# Patient Record
Sex: MALE | Race: WHITE | URBAN - METROPOLITAN AREA
[De-identification: names, ages, dates, MRNs, and addresses within clinical notes are randomized per-mention and may not be internally consistent; named-entity substitution may affect disease eponyms.]

---

## 2017-01-19 ENCOUNTER — TELEPHONE (OUTPATIENT)
Dept: INTERNAL MEDICINE | Age: 32
End: 2017-01-19

## 2017-09-22 ENCOUNTER — TELEPHONE (OUTPATIENT)
Dept: INTERNAL MEDICINE | Age: 32
End: 2017-09-22

## 2017-09-22 RX ORDER — AZITHROMYCIN 250 MG/1
TABLET, FILM COATED ORAL
Qty: 1 PACKET | Refills: 0 | Status: SHIPPED | OUTPATIENT
Start: 2017-09-22 | End: 2017-10-02

## 2017-11-21 ENCOUNTER — OFFICE VISIT (OUTPATIENT)
Dept: INTERNAL MEDICINE | Age: 32
End: 2017-11-21

## 2017-11-21 VITALS
DIASTOLIC BLOOD PRESSURE: 70 MMHG | BODY MASS INDEX: 23.05 KG/M2 | SYSTOLIC BLOOD PRESSURE: 110 MMHG | WEIGHT: 161 LBS | HEIGHT: 70 IN

## 2017-11-21 DIAGNOSIS — L70.0 ACNE VULGARIS: ICD-10-CM

## 2017-11-21 DIAGNOSIS — Z00.00 PE (PHYSICAL EXAM), ANNUAL: Primary | ICD-10-CM

## 2017-11-21 PROCEDURE — 99395 PREV VISIT EST AGE 18-39: CPT | Performed by: INTERNAL MEDICINE

## 2017-11-21 RX ORDER — SULFAMETHOXAZOLE AND TRIMETHOPRIM 800; 160 MG/1; MG/1
1 TABLET ORAL ONCE
Qty: 30 TABLET | Refills: 3 | Status: SHIPPED | OUTPATIENT
Start: 2017-11-21 | End: 2017-11-21

## 2017-11-21 RX ORDER — CETIRIZINE HYDROCHLORIDE 10 MG/1
10 TABLET ORAL DAILY PRN
COMMUNITY

## 2017-11-21 ASSESSMENT — PATIENT HEALTH QUESTIONNAIRE - PHQ9
1. LITTLE INTEREST OR PLEASURE IN DOING THINGS: 0
SUM OF ALL RESPONSES TO PHQ QUESTIONS 1-9: 0
SUM OF ALL RESPONSES TO PHQ9 QUESTIONS 1 & 2: 0
2. FEELING DOWN, DEPRESSED OR HOPELESS: 0

## 2017-11-21 NOTE — PROGRESS NOTES
file.      Allergies:  Minocycline    Current Medications:    Prior to Admission medications    Medication Sig Start Date End Date Taking? Authorizing Provider   cetirizine (ZYRTEC) 10 MG tablet Take 10 mg by mouth daily as needed for Allergies   Yes Historical Provider, MD   sulfamethoxazole-trimethoprim (BACTRIM DS) 800-160 MG per tablet Take 1 tablet by mouth once for 1 dose 11/21/17 11/21/17 Yes Autumn Mars MD   Omega-3 Fatty Acids (FISH OIL) 1000 MG CAPS Take 3,000 mg by mouth 3 times daily   Yes Historical Provider, MD   Multiple Vitamins-Minerals (MULTIVITAMIN PO) Take  by mouth. Yes Historical Provider, MD           Physical Exam:      Constitutional:  Well developed, well nourished, no acute distress, non-toxic appearance   Eyes:  PERRL, conjunctiva normal   HENT:  Atraumatic, external ears normal, nose normal, oropharynx moist, no pharyngeal exudates. Neck- normal range of motion, no tenderness, supple   Respiratory:  No respiratory distress, normal breath sounds, no rales, no wheezing   Cardiovascular:  Normal rate, normal rhythm, no murmurs, no gallops, no rubs   GI:  Soft, nondistended, normal bowel sounds, nontender, no organomegaly, no mass, no rebound, no guarding   :  No costovertebral angle tenderness   Musculoskeletal:  No edema, no tenderness, no deformities. Back- no tenderness  Integument:  Well hydrated, active vulgaris with overlying crusting with excoriations over the upper back  Lymphatic:  No lymphadenopathy noted   Neurologic:  Alert & oriented x 3, CN 2-12 normal, normal motor function, normal sensory function, no focal deficits noted   Psychiatric:  Speech and behavior appropriate       Vitals: /70   Ht 5' 10\" (1.778 m)   Wt 161 lb (73 kg)   BMI 23.10 kg/m²     Body mass index is 23.1 kg/m².   Wt Readings from Last 3 Encounters:   11/21/17 161 lb (73 kg)   11/29/16 162 lb (73.5 kg)   11/19/15 155 lb (70.3 kg)         LABS:    CBC:   Lab Results   Component Value Date    WBC

## 2017-12-07 LAB
A/G RATIO: 1.7 (CALC) (ref 1–2.5)
ALBUMIN SERPL-MCNC: 4.6 G/DL (ref 3.6–5.1)
ALP BLD-CCNC: 83 U/L (ref 40–115)
ALT SERPL-CCNC: 18 U/L (ref 9–46)
AMORPHOUS: NORMAL /HPF
AST SERPL-CCNC: 16 U/L (ref 10–40)
ATYPICAL LYMPHOCYTE RELATIVE PERCENT: NORMAL % (ref 0–10)
BACTERIA: NORMAL /HPF
BAND NEUTROPHILS: NORMAL %
BANDED NEUTROPHILS ABSOLUTE COUNT: NORMAL CELLS/UL (ref 0–750)
BASOPHILS ABSOLUTE: 61 CELLS/UL (ref 0–200)
BASOPHILS RELATIVE PERCENT: 1.1 %
BILIRUB SERPL-MCNC: 0.7 MG/DL (ref 0.2–1.2)
BILIRUBIN URINE: NEGATIVE
BLASTS ABSOLUTE COUNT: NORMAL CELLS/UL
BLASTS RELATIVE PERCENT: NORMAL %
BLOOD, URINE: NEGATIVE
BUN / CREAT RATIO: NORMAL (CALC) (ref 6–22)
BUN BLDV-MCNC: 13 MG/DL (ref 7–25)
CALCIUM OXALATE CRYSTALS: NORMAL /HPF
CALCIUM SERPL-MCNC: 9.2 MG/DL (ref 8.6–10.3)
CASTS: NORMAL /LPF
CHLORIDE BLD-SCNC: 104 MMOL/L (ref 98–110)
CHOLESTEROL, TOTAL: 147 MG/DL
CHOLESTEROL/HDL RATIO: 3 (CALC)
CHOLESTEROL: 98 MG/DL (CALC)
CLARITY: CLEAR
CO2: 29 MMOL/L (ref 20–31)
COLOR: YELLOW
COMMENT: NORMAL
CREAT SERPL-MCNC: 0.91 MG/DL (ref 0.6–1.35)
CRYSTALS: NORMAL /HPF
EOSINOPHILS ABSOLUTE: 121 CELLS/UL (ref 15–500)
EOSINOPHILS RELATIVE PERCENT: 2.2 %
EPITHELIAL CELLS, UA: NORMAL /HPF
GFR AFRICAN AMERICAN: 129 ML/MIN/1.73M2
GFR SERPL CREATININE-BSD FRML MDRD: 111 ML/MIN/1.73M2
GLOBULIN: 2.7 G/DL (CALC) (ref 1.9–3.7)
GLUCOSE BLD-MCNC: 94 MG/DL (ref 65–99)
GLUCOSE URINE: NEGATIVE
GRANULAR CASTS: NORMAL /LPF
HCT VFR BLD CALC: 47.4 % (ref 38.5–50)
HDLC SERPL-MCNC: 49 MG/DL
HEMOGLOBIN: 16.4 G/DL (ref 13.2–17.1)
HYALINE CASTS: NORMAL /LPF
KETONES, URINE: NEGATIVE
LDL CHOLESTEROL CALCULATED: 81 MG/DL (CALC)
LEUKOCYTE ESTERASE, URINE: NEGATIVE
LEUKOCYTES, UA: NORMAL /HPF
LYMPHOCYTES ABSOLUTE: 2519 CELLS/UL (ref 850–3900)
LYMPHOCYTES RELATIVE PERCENT: 45.8 %
MCH RBC QN AUTO: 30.6 PG (ref 27–33)
MCHC RBC AUTO-ENTMCNC: 34.6 G/DL (ref 32–36)
MCV RBC AUTO: 88.4 FL (ref 80–100)
METAMYELOCYTES ABSOLUTE COUNT: NORMAL CELLS/UL
METAMYELOCYTES RELATIVE PERCENT: NORMAL %
MONOCYTES ABSOLUTE: 545 CELLS/UL (ref 200–950)
MONOCYTES RELATIVE PERCENT: 9.9 %
MYELOCYTE PERCENT: NORMAL %
MYELOCYTES ABSOLUTE COUNT: NORMAL CELLS/UL
NEUTROPHILS ABSOLUTE: 2255 CELLS/UL (ref 1500–7800)
NITRITE, URINE: NEGATIVE
NUCLEATED RED BLOOD CELLS: NORMAL /100 WBC
NUCLEATED RED BLOOD CELLS: NORMAL CELLS/UL
PDW BLD-RTO: 11.9 % (ref 11–15)
PH UA: 6.5 (ref 5–8)
PLATELET # BLD: 197 THOUSAND/UL (ref 140–400)
PMV BLD AUTO: 10.4 FL (ref 7.5–12.5)
POTASSIUM SERPL-SCNC: 4.6 MMOL/L (ref 3.5–5.3)
PROMYELOCYTES ABSOLUTE COUNT: NORMAL CELLS/UL
PROMYELOCYTES PERCENT: NORMAL %
PROTEIN UA: NEGATIVE
RBC # BLD: 5.36 MILLION/UL (ref 4.2–5.8)
RBC UA: NORMAL /HPF
REDUCING SUBSTANCES, URINE: NORMAL %
RENAL EPITHELIAL, POC: NORMAL /HPF
SEGMENTED NEUTROPHILS RELATIVE PERCENT: 41 %
SODIUM BLD-SCNC: 138 MMOL/L (ref 135–146)
SPECIFIC GRAVITY UA: 1.01 (ref 1–1.03)
TOTAL PROTEIN: 7.3 G/DL (ref 6.1–8.1)
TRANSITIONAL EPITHELIAL: NORMAL /HPF
TRIGL SERPL-MCNC: 90 MG/DL
TRIPLE PHOSPHATE CRYSTALS: NORMAL /HPF
TSH ULTRASENSITIVE: 1.9 MIU/L (ref 0.4–4.5)
URATE CRYSTALS, URINE: NORMAL /HPF
WBC # BLD: 5.5 THOUSAND/UL (ref 3.8–10.8)
YEAST: NORMAL /HPF

## 2018-02-27 ENCOUNTER — OFFICE VISIT (OUTPATIENT)
Dept: INTERNAL MEDICINE | Age: 33
End: 2018-02-27

## 2018-02-27 VITALS
HEIGHT: 70 IN | HEART RATE: 80 BPM | BODY MASS INDEX: 23.77 KG/M2 | OXYGEN SATURATION: 98 % | SYSTOLIC BLOOD PRESSURE: 134 MMHG | WEIGHT: 166 LBS | TEMPERATURE: 99.1 F | DIASTOLIC BLOOD PRESSURE: 78 MMHG

## 2018-02-27 DIAGNOSIS — R68.89 FLU-LIKE SYMPTOMS: ICD-10-CM

## 2018-02-27 DIAGNOSIS — J06.9 ACUTE URI: Primary | ICD-10-CM

## 2018-02-27 LAB
INFLUENZA A ANTIGEN, POC: NORMAL
INFLUENZA B ANTIGEN, POC: NORMAL

## 2018-02-27 PROCEDURE — 87804 INFLUENZA ASSAY W/OPTIC: CPT | Performed by: INTERNAL MEDICINE

## 2018-02-27 PROCEDURE — 99213 OFFICE O/P EST LOW 20 MIN: CPT | Performed by: INTERNAL MEDICINE

## 2018-02-27 RX ORDER — DEXTROMETHORPHAN HYDROBROMIDE AND PROMETHAZINE HYDROCHLORIDE 15; 6.25 MG/5ML; MG/5ML
5 SYRUP ORAL EVERY 4 HOURS PRN
Qty: 240 ML | Refills: 1 | Status: SHIPPED | OUTPATIENT
Start: 2018-02-27 | End: 2018-12-27 | Stop reason: ALTCHOICE

## 2018-02-27 ASSESSMENT — ENCOUNTER SYMPTOMS
COUGH: 1
WHEEZING: 0
SHORTNESS OF BREATH: 0

## 2018-02-28 NOTE — PROGRESS NOTES
Subjective:      Patient ID: Rodolfo Lyons is a 28 y.o. male. Chief Complaint   Patient presents with    Cough     congestion, sore throat, no fever, no appiette, chills x3 to 4 days       Cough   This is a new problem. Episode onset: 4 days. The problem has been unchanged. The problem occurs every few minutes. The cough is non-productive. Associated symptoms include a fever and nasal congestion. Pertinent negatives include no shortness of breath or wheezing. The symptoms are aggravated by cold air. He has tried OTC cough suppressant for the symptoms. The treatment provided mild relief. His past medical history is significant for environmental allergies. There is no history of asthma or pneumonia. Current Outpatient Prescriptions on File Prior to Visit   Medication Sig Dispense Refill    cetirizine (ZYRTEC) 10 MG tablet Take 10 mg by mouth daily as needed for Allergies      Omega-3 Fatty Acids (FISH OIL) 1000 MG CAPS Take 3,000 mg by mouth 3 times daily      Multiple Vitamins-Minerals (MULTIVITAMIN PO) Take  by mouth. No current facility-administered medications on file prior to visit. Allergies   Allergen Reactions    Minocycline        Review of Systems   Constitutional: Positive for fever. Respiratory: Positive for cough. Negative for shortness of breath and wheezing. Endocrine: Negative for polydipsia and polyuria. Allergic/Immunologic: Positive for environmental allergies. Objective:   Physical Exam   Constitutional: He is oriented to person, place, and time. He appears well-developed and well-nourished. Cardiovascular: Normal rate, regular rhythm and normal heart sounds. No murmur heard. Pulmonary/Chest: Effort normal and breath sounds normal. He has no rales. Musculoskeletal: He exhibits no edema. Neurological: He is alert and oriented to person, place, and time. Skin: No rash noted. Assessment and plan       1. Acute URI  Likely viral URI.  Symptomatic care. No antibiotic. - promethazine-dextromethorphan (PROMETHAZINE-DM) 6.25-15 MG/5ML syrup; Take 5 mLs by mouth every 4 hours as needed for Cough  Dispense: 240 mL; Refill: 1    2.  Flu-like symptoms  Negative flu test.   - POCT Influenza A/B Antigen

## 2018-12-27 ENCOUNTER — OFFICE VISIT (OUTPATIENT)
Dept: INTERNAL MEDICINE CLINIC | Age: 33
End: 2018-12-27
Payer: COMMERCIAL

## 2018-12-27 VITALS
DIASTOLIC BLOOD PRESSURE: 70 MMHG | HEIGHT: 70 IN | SYSTOLIC BLOOD PRESSURE: 110 MMHG | BODY MASS INDEX: 24.79 KG/M2 | WEIGHT: 173.2 LBS

## 2018-12-27 DIAGNOSIS — J30.1 SEASONAL ALLERGIC RHINITIS DUE TO POLLEN: ICD-10-CM

## 2018-12-27 DIAGNOSIS — Z00.00 ANNUAL PHYSICAL EXAM: Primary | ICD-10-CM

## 2018-12-27 PROCEDURE — 99395 PREV VISIT EST AGE 18-39: CPT | Performed by: HOSPITALIST

## 2018-12-27 ASSESSMENT — PATIENT HEALTH QUESTIONNAIRE - PHQ9
SUM OF ALL RESPONSES TO PHQ QUESTIONS 1-9: 0
2. FEELING DOWN, DEPRESSED OR HOPELESS: 0
2. FEELING DOWN, DEPRESSED OR HOPELESS: 0
1. LITTLE INTEREST OR PLEASURE IN DOING THINGS: 0
2. FEELING DOWN, DEPRESSED OR HOPELESS: 0
SUM OF ALL RESPONSES TO PHQ QUESTIONS 1-9: 0
SUM OF ALL RESPONSES TO PHQ QUESTIONS 1-9: 0
SUM OF ALL RESPONSES TO PHQ9 QUESTIONS 1 & 2: 0
SUM OF ALL RESPONSES TO PHQ QUESTIONS 1-9: 0
SUM OF ALL RESPONSES TO PHQ9 QUESTIONS 1 & 2: 0
SUM OF ALL RESPONSES TO PHQ9 QUESTIONS 1 & 2: 0

## 2018-12-27 ASSESSMENT — ENCOUNTER SYMPTOMS
RESPIRATORY NEGATIVE: 1
EYES NEGATIVE: 1
ALLERGIC/IMMUNOLOGIC NEGATIVE: 1
GASTROINTESTINAL NEGATIVE: 1

## 2018-12-27 NOTE — PROGRESS NOTES
2018    Davis Fabian (:  1985) is a 35 y.o. male, here for annual physical examination. HPI  Raj Hubbard doesn't report any major complaints. Has some mild chronic sinus congestion and postnasal drainage. Had recently blood work for life insurance policy    Review of Systems   Constitutional: Negative. HENT: Positive for congestion (had  common cold about 1 month ago) and postnasal drip. Eyes: Negative. Respiratory: Negative. Cardiovascular: Negative. Gastrointestinal: Negative. Endocrine: Negative. Genitourinary: Negative. Musculoskeletal: Negative. Allergic/Immunologic: Negative. Neurological: Negative. Psychiatric/Behavioral: Negative. Prior to Visit Medications    Medication Sig Taking? Authorizing Provider   cetirizine (ZYRTEC) 10 MG tablet Take 10 mg by mouth daily as needed for Allergies Yes Historical Provider, MD   Omega-3 Fatty Acids (FISH OIL) 1000 MG CAPS Take 1,000 mg by mouth daily  Yes Historical Provider, MD   Multiple Vitamins-Minerals (MULTIVITAMIN PO) Take  by mouth. 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 Social History     Social History    Marital status:      Spouse name: N/A    Number of children: N/A    Years of education: N/A     Occupational History    Not on file.      Social History Main Topics    Smoking status: Never Smoker    Smokeless tobacco: Never Used    Alcohol use 1.2 oz/week     2 Glasses of wine per week    Drug use: Unknown    Sexual activity: Not on file     Other Topics Concern    Not on file     Social History Narrative    No narrative on file        Family History   Problem Relation Age of

## 2019-10-29 ENCOUNTER — TELEPHONE (OUTPATIENT)
Dept: INTERNAL MEDICINE CLINIC | Age: 34
End: 2019-10-29

## 2019-10-29 DIAGNOSIS — Z00.00 ANNUAL PHYSICAL EXAM: Primary | ICD-10-CM

## 2019-11-05 LAB
AMORPHOUS: NORMAL /HPF
BACTERIA: NORMAL /HPF
BILIRUBIN URINE: NEGATIVE
BLOOD, URINE: NEGATIVE
CALCIUM OXALATE CRYSTALS: NORMAL /HPF
CASTS: NORMAL /LPF
CLARITY: CLEAR
COLOR: YELLOW
COMMENT: NORMAL
COMMENT: NORMAL
CRYSTALS: NORMAL /HPF
EPITHELIAL CELLS, UA: NORMAL /HPF
GLUCOSE URINE: NEGATIVE
GRANULAR CASTS: NORMAL /LPF
HYALINE CASTS: NORMAL /LPF
KETONES, URINE: NEGATIVE
LEUKOCYTE ESTERASE, URINE: NEGATIVE
LEUKOCYTES, UA: NORMAL /HPF
NITRITE, URINE: NEGATIVE
PH UA: 7 (ref 5–8)
PROTEIN UA: NEGATIVE
RBC UA: NORMAL /HPF
RENAL EPITHELIAL, POC: NORMAL /HPF
SPECIFIC GRAVITY UA: 1.01 (ref 1–1.03)
TRANSITIONAL EPITHELIAL: NORMAL /HPF
TRIPLE PHOSPHATE CRYSTALS: NORMAL /HPF
URATE CRYSTALS, URINE: NORMAL /HPF
YEAST: NORMAL /HPF

## 2019-11-06 ENCOUNTER — OFFICE VISIT (OUTPATIENT)
Dept: INTERNAL MEDICINE CLINIC | Age: 34
End: 2019-11-06
Payer: COMMERCIAL

## 2019-11-06 VITALS
DIASTOLIC BLOOD PRESSURE: 62 MMHG | SYSTOLIC BLOOD PRESSURE: 126 MMHG | HEIGHT: 70 IN | BODY MASS INDEX: 24.34 KG/M2 | WEIGHT: 170 LBS

## 2019-11-06 DIAGNOSIS — Z00.00 ANNUAL PHYSICAL EXAM: Primary | ICD-10-CM

## 2019-11-06 PROCEDURE — 99395 PREV VISIT EST AGE 18-39: CPT | Performed by: INTERNAL MEDICINE

## 2019-11-06 ASSESSMENT — PATIENT HEALTH QUESTIONNAIRE - PHQ9
SUM OF ALL RESPONSES TO PHQ QUESTIONS 1-9: 0
1. LITTLE INTEREST OR PLEASURE IN DOING THINGS: 0
SUM OF ALL RESPONSES TO PHQ QUESTIONS 1-9: 0
SUM OF ALL RESPONSES TO PHQ9 QUESTIONS 1 & 2: 0
2. FEELING DOWN, DEPRESSED OR HOPELESS: 0

## 2021-11-29 ENCOUNTER — TELEPHONE (OUTPATIENT)
Dept: INTERNAL MEDICINE CLINIC | Age: 36
End: 2021-11-29

## 2021-11-29 DIAGNOSIS — Z00.00 WELL ADULT EXAM: Primary | ICD-10-CM

## 2021-11-29 NOTE — TELEPHONE ENCOUNTER
----- Message from Jon Feng sent at 11/29/2021  4:04 PM EST -----  Subject: Message to Provider    QUESTIONS  Information for Provider? patient would like an order for blood work   ---------------------------------------------------------------------------  --------------  Kar Oviedo INFO  What is the best way for the office to contact you? OK to leave message on   voicemail  Preferred Call Back Phone Number? [phone]  ---------------------------------------------------------------------------  --------------  SCRIPT ANSWERS  Relationship to Patient?  Self

## 2021-11-30 ENCOUNTER — TELEPHONE (OUTPATIENT)
Dept: INTERNAL MEDICINE CLINIC | Age: 36
End: 2021-11-30

## 2021-11-30 NOTE — TELEPHONE ENCOUNTER
Patient called in stating that he would like his lab orders mailed to him      [address] [facility] [address]      Pls advise.

## 2022-07-11 ENCOUNTER — TELEPHONE (OUTPATIENT)
Dept: INTERNAL MEDICINE CLINIC | Age: 37
End: 2022-07-11

## 2022-07-11 DIAGNOSIS — Z00.00 WELL ADULT EXAM: Primary | ICD-10-CM

## 2022-07-11 NOTE — TELEPHONE ENCOUNTER
Needs new lab orders for his Physical coming up. Would like them emailed [facility]   EMAIL: [email or web address]          Please advise

## 2022-07-12 ENCOUNTER — TELEPHONE (OUTPATIENT)
Dept: INTERNAL MEDICINE CLINIC | Age: 37
End: 2022-07-12

## 2022-07-12 NOTE — TELEPHONE ENCOUNTER
----- Message from Mayank Raymond sent at 7/12/2022  9:51 AM EDT -----  Subject: Appointment Request    Reason for Call: Established Patient Appointment needed: Semi-Routine Skin   Problem    QUESTIONS    Reason for appointment request? Available appointments did not meet   patient need     Additional Information for Provider? The pt has an immovable lump on his   head that he wants the doctor to look at.  He's off on thursday & friday to   schedule.  ---------------------------------------------------------------------------  --------------  Unique Waggoner [facility]  [phone]; OK to leave message on voicemail  ---------------------------------------------------------------------------  --------------  SCRIPT ANSWERS  COVID Screen: [facility]

## 2022-07-12 NOTE — TELEPHONE ENCOUNTER
Call returned to patient. No answer. Left message for patient to call back to schedule an appointment.

## 2022-07-14 ENCOUNTER — TELEPHONE (OUTPATIENT)
Dept: INTERNAL MEDICINE CLINIC | Age: 37
End: 2022-07-14

## 2022-07-14 NOTE — TELEPHONE ENCOUNTER
----- Message from Cat Simons sent at 7/13/2022 10:14 AM EDT -----  Subject: Appointment Request    Reason for Call: Established Patient Appointment needed: Routine (Patient   Request) No Script    QUESTIONS    Reason for appointment request? Available appointments did not meet   patient need     Additional Information for Provider?  Patient wants to delay appointment to   next week, Divya Munoz has no openings, requesting Prashanth Alberto take him if possible,   please advise.  ---------------------------------------------------------------------------  --------------  Michaelle Tomlinson [facility]  [phone]; OK to leave message on voicemail  ---------------------------------------------------------------------------  --------------  SCRIPT ANSWERS  COVID Screen: [facility] T[facility]

## 2022-07-19 ENCOUNTER — TELEPHONE (OUTPATIENT)
Dept: INTERNAL MEDICINE CLINIC | Age: 37
End: 2022-07-19

## 2022-07-19 NOTE — TELEPHONE ENCOUNTER
----- Message from Trudy Werner sent at 7/18/2022 10:29 AM EDT -----  Subject: Appointment Request    Reason for Call: Established Patient Appointment needed: Semi-Routine Skin   Problem    QUESTIONS    Reason for appointment request? No appointments available during search     Additional Information for Provider?  Patient was scheduled for 07/19/2022,   but kym has gone down a little bit, and is requesting he be seen on   07/22/22 if possible, please contact patient to advise.   ---------------------------------------------------------------------------  --------------  [address]  [phone]; OK to leave message on voicemail  ---------------------------------------------------------------------------  --------------  SCRIPT ANSWERS  COVID Screen: Yaneth Bautista

## 2022-08-06 LAB
A/G RATIO: 1.8 (CALC) (ref 1–2.5)
ALBUMIN SERPL-MCNC: 4.7 G/DL (ref 3.6–5.1)
ALP BLD-CCNC: 88 U/L (ref 36–130)
ALT SERPL-CCNC: 25 U/L (ref 9–46)
AST SERPL-CCNC: 20 U/L (ref 10–40)
ATYPICAL LYMPHOCYTE RELATIVE PERCENT: NORMAL % (ref 0–10)
BAND NEUTROPHILS: NORMAL %
BANDED NEUTROPHILS ABSOLUTE COUNT: NORMAL CELLS/UL (ref 0–750)
BASOPHILS ABSOLUTE: 50 CELLS/UL (ref 0–200)
BASOPHILS RELATIVE PERCENT: 0.9 %
BILIRUB SERPL-MCNC: 0.5 MG/DL (ref 0.2–1.2)
BLASTS ABSOLUTE COUNT: NORMAL CELLS/UL
BLASTS RELATIVE PERCENT: NORMAL %
BUN / CREAT RATIO: NORMAL (CALC) (ref 6–22)
BUN BLDV-MCNC: 20 MG/DL (ref 7–25)
CALCIUM SERPL-MCNC: 9.4 MG/DL (ref 8.6–10.3)
CHLORIDE BLD-SCNC: 104 MMOL/L (ref 98–110)
CHOLESTEROL, TOTAL: 144 MG/DL
CHOLESTEROL/HDL RATIO: 2.3 (CALC)
CO2: 30 MMOL/L (ref 20–32)
COMMENT: NORMAL
CREAT SERPL-MCNC: 0.75 MG/DL (ref 0.6–1.26)
EOSINOPHILS ABSOLUTE: 88 CELLS/UL (ref 15–500)
EOSINOPHILS RELATIVE PERCENT: 1.6 %
ESTIMATED GLOMERULAR FILTRATION RATE CREATININE EQUATION: 120 ML/MIN/1.73M2
GLOBULIN: 2.6 G/DL (CALC) (ref 1.9–3.7)
GLUCOSE BLD-MCNC: 93 MG/DL (ref 65–99)
HCT VFR BLD CALC: 44.1 % (ref 38.5–50)
HDLC SERPL-MCNC: 64 MG/DL
HEMOGLOBIN: 15 G/DL (ref 13.2–17.1)
LDL CHOLESTEROL CALCULATED: 65 MG/DL (CALC)
LYMPHOCYTES ABSOLUTE: 2635 CELLS/UL (ref 850–3900)
LYMPHOCYTES RELATIVE PERCENT: 47.9 %
MCH RBC QN AUTO: 30.9 PG (ref 27–33)
MCHC RBC AUTO-ENTMCNC: 34 G/DL (ref 32–36)
MCV RBC AUTO: 90.9 FL (ref 80–100)
METAMYELOCYTES ABSOLUTE COUNT: NORMAL CELLS/UL
METAMYELOCYTES RELATIVE PERCENT: NORMAL %
MONOCYTES ABSOLUTE: 440 CELLS/UL (ref 200–950)
MONOCYTES RELATIVE PERCENT: 8 %
MYELOCYTE PERCENT: NORMAL %
MYELOCYTES ABSOLUTE COUNT: NORMAL CELLS/UL
NEUTROPHILS ABSOLUTE: 2288 CELLS/UL (ref 1500–7800)
NONHDLC SERPL-MCNC: 80 MG/DL (CALC)
NUCLEATED RED BLOOD CELLS: NORMAL /100 WBC
NUCLEATED RED BLOOD CELLS: NORMAL CELLS/UL
PDW BLD-RTO: 11.9 % (ref 11–15)
PLATELET # BLD: 187 THOUSAND/UL (ref 140–400)
PMV BLD AUTO: 9.7 FL (ref 7.5–12.5)
POTASSIUM SERPL-SCNC: 4.1 MMOL/L (ref 3.5–5.3)
PROMYELOCYTES ABSOLUTE COUNT: NORMAL CELLS/UL
PROMYELOCYTES PERCENT: NORMAL %
RBC # BLD: 4.85 MILLION/UL (ref 4.2–5.8)
SEGMENTED NEUTROPHILS RELATIVE PERCENT: 41.6 %
SODIUM BLD-SCNC: 139 MMOL/L (ref 135–146)
TOTAL PROTEIN: 7.3 G/DL (ref 6.1–8.1)
TRIGL SERPL-MCNC: 73 MG/DL
TSH ULTRASENSITIVE: 2.6 MIU/L (ref 0.4–4.5)
WBC # BLD: 5.5 THOUSAND/UL (ref 3.8–10.8)

## 2022-08-15 ENCOUNTER — TELEPHONE (OUTPATIENT)
Dept: INTERNAL MEDICINE CLINIC | Age: 37
End: 2022-08-15

## 2022-08-15 NOTE — TELEPHONE ENCOUNTER
----- Message from Ngozi Dockery, [address] sent at 8/15/2022  9:28 AM EDT -----  Subject: Appointment Request    Reason for Call: Established Patient Appointment needed: Routine Physical   Exam    QUESTIONS    Reason for appointment request? No appointments available during search     Additional Information for Provider? Pt called to r/s his physical appt   for 8/19/22 @ 2:15. Pt is also asking if he has had his Polio vaccination?    Please call pt to advise & accommodate.  ---------------------------------------------------------------------------  --------------  Warden Hansen INFO  [phone]; OK to leave message on voicemail  ---------------------------------------------------------------------------  --------------  SCRIPT ANSWERS  COVID Screen: Nena Sumner

## 2022-09-06 ENCOUNTER — TELEPHONE (OUTPATIENT)
Dept: INTERNAL MEDICINE CLINIC | Age: 37
End: 2022-09-06

## 2022-09-06 NOTE — TELEPHONE ENCOUNTER
----- Message from Joey Moody sent at 9/6/2022 12:58 PM EDT -----  Subject: Appointment Request    Reason for Call: Established Patient Appointment needed: Routine Physical   Exam    QUESTIONS    Reason for appointment request? Available appointments did not meet   patient need     Additional Information for Provider? Appt Reason?  Routine Physical Exam   Patient scheduling for a routine physical. Patient screened green  ---------------------------------------------------------------------------  --------------  Marixa CHERY  [phone]; OK to leave message on voicemail  ---------------------------------------------------------------------------  --------------  SCRIPT ANSWERS  CAT Screen: Justin Current

## 2022-09-06 NOTE — TELEPHONE ENCOUNTER
Pt called back and tried to get him in with Dr Refugio Price next available Physical but it was to far out.  Advise can get him in with Dr Derinda Brittle, said he would call back to see if there was a canceled appointment or to call him if there was a way to get put in the schedule

## 2022-10-17 DIAGNOSIS — J06.9 VIRAL UPPER RESPIRATORY TRACT INFECTION: Primary | ICD-10-CM

## 2022-10-17 RX ORDER — PROMETHAZINE HYDROCHLORIDE AND PHENYLEPHRINE HYDROCHLORIDE 6.25; 5 MG/5ML; MG/5ML
5 SYRUP ORAL EVERY 6 HOURS
Qty: 120 ML | Refills: 0 | Status: SHIPPED | OUTPATIENT
Start: 2022-10-17 | End: 2022-11-01

## 2022-10-17 RX ORDER — AZITHROMYCIN 250 MG/1
250 TABLET, FILM COATED ORAL SEE ADMIN INSTRUCTIONS
Qty: 6 TABLET | Refills: 0 | Status: SHIPPED | OUTPATIENT
Start: 2022-10-17 | End: 2022-10-22

## 2022-10-17 NOTE — TELEPHONE ENCOUNTER
Patient called, started with a cough on 10/10/22. Mucus, congestion, no fever, runny nose, chest and throat hurt when coughing. Tested 10/13/22 for covid and it was negative. Would Dr. Remigio Porter please call in something (the Robitussin is not working).     [facility] [phone] [facility]    Please call and advise

## 2022-11-01 ENCOUNTER — OFFICE VISIT (OUTPATIENT)
Dept: INTERNAL MEDICINE CLINIC | Age: 37
End: 2022-11-01
Payer: COMMERCIAL

## 2022-11-01 VITALS
DIASTOLIC BLOOD PRESSURE: 76 MMHG | SYSTOLIC BLOOD PRESSURE: 110 MMHG | BODY MASS INDEX: 21.09 KG/M2 | WEIGHT: 147 LBS | TEMPERATURE: 97.8 F | HEART RATE: 71 BPM | OXYGEN SATURATION: 100 %

## 2022-11-01 DIAGNOSIS — J30.1 SEASONAL ALLERGIC RHINITIS DUE TO POLLEN: ICD-10-CM

## 2022-11-01 DIAGNOSIS — R63.4 WEIGHT LOSS: ICD-10-CM

## 2022-11-01 DIAGNOSIS — K58.9 IRRITABLE BOWEL SYNDROME WITHOUT DIARRHEA: ICD-10-CM

## 2022-11-01 DIAGNOSIS — Z00.00 PE (PHYSICAL EXAM), ANNUAL: Primary | ICD-10-CM

## 2022-11-01 LAB
BILIRUBIN, POC: NEGATIVE
BLOOD URINE, POC: NEGATIVE
CLARITY, POC: CLEAR
COLOR, POC: YELLOW
GLUCOSE URINE, POC: NEGATIVE
KETONES, POC: NEGATIVE
LEUKOCYTE EST, POC: NEGATIVE
NITRITE, POC: NEGATIVE
PH, POC: 5
PROTEIN, POC: NEGATIVE
SPECIFIC GRAVITY, POC: 1
UROBILINOGEN, POC: 0.2

## 2022-11-01 PROCEDURE — 99395 PREV VISIT EST AGE 18-39: CPT | Performed by: INTERNAL MEDICINE

## 2022-11-01 PROCEDURE — 81002 URINALYSIS NONAUTO W/O SCOPE: CPT | Performed by: INTERNAL MEDICINE

## 2022-11-01 SDOH — ECONOMIC STABILITY: FOOD INSECURITY: WITHIN THE PAST 12 MONTHS, THE FOOD YOU BOUGHT JUST DIDN'T LAST AND YOU DIDN'T HAVE MONEY TO GET MORE.: NEVER TRUE

## 2022-11-01 SDOH — ECONOMIC STABILITY: FOOD INSECURITY: WITHIN THE PAST 12 MONTHS, YOU WORRIED THAT YOUR FOOD WOULD RUN OUT BEFORE YOU GOT MONEY TO BUY MORE.: NEVER TRUE

## 2022-11-01 ASSESSMENT — SOCIAL DETERMINANTS OF HEALTH (SDOH): HOW HARD IS IT FOR YOU TO PAY FOR THE VERY BASICS LIKE FOOD, HOUSING, MEDICAL CARE, AND HEATING?: NOT HARD AT ALL

## 2022-11-01 ASSESSMENT — PATIENT HEALTH QUESTIONNAIRE - PHQ9
1. LITTLE INTEREST OR PLEASURE IN DOING THINGS: 0
SUM OF ALL RESPONSES TO PHQ9 QUESTIONS 1 & 2: 0
2. FEELING DOWN, DEPRESSED OR HOPELESS: 0
SUM OF ALL RESPONSES TO PHQ QUESTIONS 1-9: 0

## 2022-11-01 NOTE — PROGRESS NOTES
Annual Wellness Visit     Patient:  Dago Newberry                                               : 1985  Age: 40 y.o. MRN: [id number]  Date : 2022      CHIEF COMPLAINT: Dago Newberry is a 40 y.o. male who presents for : Physical exam    Problem is stable with over-the-counter medicines 1. Seasonal allergic rhinitis due to pollen      2. Irritable bowel syndrome without diarrhea  This problem is stable and asymptomatic  Physical exam only feels good has lost about 20 pounds as result of change in his diet he is now more vegetarian not been exercising much but otherwise has been feeling fine denies any chest pain or shortness of breath      Patient Active Problem List    Diagnosis Date Noted    Allergic rhinitis 2013    IBS (irritable bowel syndrome) 2012       Constitutional:  Denies fever or chills   Eyes:  Denies change in visual acuity   HENT:  Denies nasal congestion or sore throat   Respiratory:  Denies cough or shortness of breath   Cardiovascular:  Denies chest pain or edema   GI:  Denies abdominal pain, nausea, vomiting, bloody stools or diarrhea   :  Denies dysuria   Musculoskeletal:  Denies back pain or joint pain   Integument:  Denies rash   Neurologic:  Denies headache, focal weakness or sensory changes   Endocrine:  Denies polyuria or polydipsia   Lymphatic:  Denies swollen glands   Psychiatric:  Denies depression or anxiety     Past Medical History:        Diagnosis Date    Allergic rhinitis 2013    Hepatitis     Hepatitis, unspecified     h/o    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     Family History:  Family History   Problem Relation Age of Onset    Diabetes Brother        Social History:  Social History     Socioeconomic History    Marital status:      Spouse name: None    Number of children: None    Years of education: None    Highest education level: None   Tobacco Use    Smoking status: Never    Smokeless tobacco: Never   Substance and Sexual Activity    Alcohol use: Yes     Alcohol/week: 2.0 standard drinks     Types: 2 Glasses of wine per week     Social Determinants of Health     Financial Resource Strain: Low Risk     Difficulty of Paying Living Expenses: Not hard at all   Food Insecurity: No Food Insecurity    Worried About Running Out of Food in the Last Year: Never true    Ran Out of Food in the Last Year: Never true         Allergies:  Minocycline    Current Medications:    Prior to Admission medications    Medication Sig Start Date End Date Taking? Authorizing Provider   Multiple Vitamins-Minerals (MULTIVITAMIN PO) Take  by mouth. Yes Historical Provider, MD   cetirizine (ZYRTEC) 10 MG tablet Take 10 mg by mouth daily as needed for Allergies  Patient not taking: Reported on 11/1/2022    Historical Provider, MD   Omega-3 Fatty Acids (FISH OIL) 1000 MG CAPS Take 1,000 mg by mouth daily   Patient not taking: Reported on 11/1/2022    Historical Provider, MD           Physical Exam:      Constitutional:  Well developed, thin no acute distress, non-toxic appearance   Eyes:  PERRL, conjunctiva normal   HENT:  Atraumatic, external ears normal, nose normal, oropharynx moist, no pharyngeal exudates. Neck- normal range of motion, no tenderness, supple   Respiratory:  No respiratory distress, normal breath sounds, no rales, no wheezing   Cardiovascular:  Normal rate, normal rhythm, no murmurs, no gallops, no rubs   GI:  Soft, nondistended, normal bowel sounds, nontender, no organomegaly, no mass, no rebound, no guarding   :  No costovertebral angle tenderness   Musculoskeletal:  No edema, no tenderness, no deformities.  Back- no tenderness  Integument:  Well hydrated, no rash   Lymphatic:  No lymphadenopathy noted   Neurologic:  Alert & oriented x 3, CN 2-12 normal, normal motor function, normal sensory function, no focal deficits noted Psychiatric:  Speech and behavior appropriate       Vitals: /76   Pulse 71   Temp 97.8 °F (36.6 °C) (Temporal)   Wt 147 lb (66.7 kg)   SpO2 100%   BMI 21.09 kg/m²     Body mass index is 21.09 kg/m². Wt Readings from Last 3 Encounters:   11/01/22 147 lb (66.7 kg)   11/06/19 170 lb (77.1 kg)   12/27/18 173 lb 3.2 oz (78.6 kg)         LABS:    CBC:   Lab Results   Component Value Date    WBC 5.5 08/05/2022    HGB 15.0 08/05/2022    HCT 44.1 08/05/2022    MCV 90.9 08/05/2022     08/05/2022         No results found for: IRON, TIBC, FERRITIN, FOLATE, PJFKJSLF16, PTH                                                          BMP:    Lab Results   Component Value Date     08/05/2022    K 4.1 08/05/2022     08/05/2022    CO2 30 08/05/2022       LFT's:   Lab Results   Component Value Date    ALT 25 08/05/2022    AST 20 08/05/2022    ALKPHOS 88 08/05/2022    BILITOT 0.5 08/05/2022       Lipids:   Lab Results   Component Value Date    CHOL 144 08/05/2022    HDL 64 08/05/2022    LDLCALC 65 08/05/2022    TRIG 73 08/05/2022       INR: No results found for: INR, PROTIME    U/A:  Lab Results   Component Value Date    LABMICR Comment 12/27/2016        No results found for: LABA1C     Lab Results   Component Value Date    CREATININE 0.75 08/05/2022       -----------------------------------------------------------------     Assessment/Plan:   1. Seasonal allergic rhinitis due to pollen  This problem is stable with current meds    2.  Irritable bowel syndrome without diarrhea  This problem is stable  Weight loss probably secondary to dietary changes strongly encouraged to increase protein in diet and continue exercise and weightlifting as tolerated  Physical exam check screening labs continue diet and exercise he is up-to-date on all his immunizations he has gotten the COVID trivalent and flu shot

## 2023-09-18 ENCOUNTER — TELEPHONE (OUTPATIENT)
Dept: INTERNAL MEDICINE CLINIC | Age: 38
End: 2023-09-18

## 2023-09-18 NOTE — TELEPHONE ENCOUNTER
----- Message from Loretta Hernandez sent at 9/18/2023  9:24 AM EDT -----  Subject: Appointment Request    Reason for Call: Established Patient Appointment needed: Routine Physical   Exam    QUESTIONS    Reason for appointment request? No appointments available during search     Additional Information for Provider? Patient would like to be seen around   October or November.  Last seen on 11/1/2022.   ---------------------------------------------------------------------------  --------------  Sharonda OLIVO  [phone]; OK to leave message on voicemail  ---------------------------------------------------------------------------  --------------  SCRIPT ANSWERS

## 2023-09-20 ENCOUNTER — TELEPHONE (OUTPATIENT)
Dept: INTERNAL MEDICINE CLINIC | Age: 38
End: 2023-09-20

## 2023-09-20 DIAGNOSIS — Z00.00 WELL ADULT EXAM: Primary | ICD-10-CM

## 2023-09-20 NOTE — TELEPHONE ENCOUNTER
Patient is requesting lab work orders  to be mailed to him to do at a lab other then [address] [place] [address] before his next appointment on October 17th 2023.     Address [address]      Please advise [phone]

## 2023-09-26 ENCOUNTER — TELEPHONE (OUTPATIENT)
Dept: INTERNAL MEDICINE CLINIC | Age: 38
End: 2023-09-26

## 2023-09-26 NOTE — TELEPHONE ENCOUNTER
As long as you do not have a fever you can get a flu shot. If you are short of breath can order an albuterol inhaler. Call returned to patient. Message left for the patient regarding recommendation.

## 2023-10-13 LAB
A/G RATIO: 2 (ref 1.2–2.2)
ALBUMIN SERPL-MCNC: 4.7 G/DL (ref 4.1–5.1)
ALP BLD-CCNC: 97 IU/L (ref 44–121)
ALT SERPL-CCNC: 23 IU/L (ref 0–44)
AMBIGUOUS ABBREVIATION: NORMAL
AMBIGUOUS ABBREVIATION: NORMAL
AST SERPL-CCNC: 22 IU/L (ref 0–40)
BASOPHILS ABSOLUTE: 0 X10E3/UL (ref 0–0.2)
BASOPHILS RELATIVE PERCENT: 1 %
BILIRUB SERPL-MCNC: 0.4 MG/DL (ref 0–1.2)
BUN / CREAT RATIO: 22 (ref 9–20)
BUN BLDV-MCNC: 19 MG/DL (ref 6–20)
CALCIUM SERPL-MCNC: 9.4 MG/DL (ref 8.7–10.2)
CHLORIDE BLD-SCNC: 103 MMOL/L (ref 96–106)
CHOLESTEROL, TOTAL: 149 MG/DL (ref 100–199)
CO2: 24 MMOL/L (ref 20–29)
COMMENT: NORMAL
CREAT SERPL-MCNC: 0.86 MG/DL (ref 0.76–1.27)
EOSINOPHILS ABSOLUTE: 0.1 X10E3/UL (ref 0–0.4)
EOSINOPHILS RELATIVE PERCENT: 2 %
ERYTHROCYTES, NUCLEATED/100 LEU: NORMAL
ESTIMATED GLOMERULAR FILTRATION RATE CREATININE EQUATION: 114 ML/MIN/1.73
GLOBULIN: 2.4 G/DL (ref 1.5–4.5)
GLUCOSE BLD-MCNC: 100 MG/DL (ref 70–99)
HCT VFR BLD CALC: 44.4 % (ref 37.5–51)
HDLC SERPL-MCNC: 58 MG/DL
HEMOGLOBIN: 15 G/DL (ref 13–17.7)
IMMATURE CELLS ABSOLUTE COUNT: NORMAL
IMMATURE GRANS (ABS): 0 X10E3/UL (ref 0–0.1)
IMMATURE GRANULOCYTES: 0 %
LDL CHOLESTEROL CALCULATED: 77 MG/DL (ref 0–99)
LYMPHOCYTES ABSOLUTE: 2.5 X10E3/UL (ref 0.7–3.1)
LYMPHOCYTES RELATIVE PERCENT: 47 %
MCH RBC QN AUTO: 30.3 PG (ref 26.6–33)
MCHC RBC AUTO-ENTMCNC: 33.8 G/DL (ref 31.5–35.7)
MCV RBC AUTO: 90 FL (ref 79–97)
MONOCYTES ABSOLUTE: 0.4 X10E3/UL (ref 0.1–0.9)
MONOCYTES RELATIVE PERCENT: 8 %
MORPHOLOGY: NORMAL
NEUTROPHILS ABSOLUTE: 2.3 X10E3/UL (ref 1.4–7)
PDW BLD-RTO: 11.9 % (ref 11.6–15.4)
PLATELET # BLD: 194 X10E3/UL (ref 150–450)
POTASSIUM SERPL-SCNC: 4.3 MMOL/L (ref 3.5–5.2)
RBC # BLD: 4.95 X10E6/UL (ref 4.14–5.8)
SEGMENTED NEUTROPHILS RELATIVE PERCENT: 42 %
SODIUM BLD-SCNC: 140 MMOL/L (ref 134–144)
TOTAL PROTEIN: 7.1 G/DL (ref 6–8.5)
TRIGL SERPL-MCNC: 69 MG/DL (ref 0–149)
TSH SERPL DL<=0.05 MIU/L-ACNC: 2.79 UIU/ML (ref 0.45–4.5)
VLDLC SERPL CALC-MCNC: 14 MG/DL (ref 5–40)
WBC # BLD: 5.3 X10E3/UL (ref 3.4–10.8)

## 2023-10-17 ENCOUNTER — OFFICE VISIT (OUTPATIENT)
Dept: INTERNAL MEDICINE CLINIC | Age: 38
End: 2023-10-17
Payer: COMMERCIAL

## 2023-10-17 VITALS
DIASTOLIC BLOOD PRESSURE: 83 MMHG | WEIGHT: 153 LBS | HEIGHT: 70 IN | BODY MASS INDEX: 21.9 KG/M2 | SYSTOLIC BLOOD PRESSURE: 132 MMHG

## 2023-10-17 DIAGNOSIS — Z23 NEED FOR TDAP VACCINATION: ICD-10-CM

## 2023-10-17 DIAGNOSIS — Z00.00 WELL ADULT EXAM: ICD-10-CM

## 2023-10-17 DIAGNOSIS — K58.9 IRRITABLE BOWEL SYNDROME WITHOUT DIARRHEA: Primary | ICD-10-CM

## 2023-10-17 DIAGNOSIS — J30.1 SEASONAL ALLERGIC RHINITIS DUE TO POLLEN: ICD-10-CM

## 2023-10-17 LAB
BILIRUBIN, POC: NORMAL
BLOOD URINE, POC: NORMAL
CLARITY, POC: CLEAR
COLOR, POC: YELLOW
GLUCOSE URINE, POC: NORMAL
KETONES, POC: NORMAL
LEUKOCYTE EST, POC: NORMAL
NITRITE, POC: NORMAL
PH, POC: 7
PROTEIN, POC: NORMAL
SPECIFIC GRAVITY, POC: 1
UROBILINOGEN, POC: 2

## 2023-10-17 PROCEDURE — 90471 IMMUNIZATION ADMIN: CPT | Performed by: INTERNAL MEDICINE

## 2023-10-17 PROCEDURE — 81002 URINALYSIS NONAUTO W/O SCOPE: CPT | Performed by: INTERNAL MEDICINE

## 2023-10-17 PROCEDURE — 90715 TDAP VACCINE 7 YRS/> IM: CPT | Performed by: INTERNAL MEDICINE

## 2023-10-17 PROCEDURE — 99395 PREV VISIT EST AGE 18-39: CPT | Performed by: INTERNAL MEDICINE

## 2023-10-17 SDOH — ECONOMIC STABILITY: HOUSING INSECURITY
IN THE LAST 12 MONTHS, WAS THERE A TIME WHEN YOU DID NOT HAVE A STEADY PLACE TO SLEEP OR SLEPT IN A SHELTER (INCLUDING NOW)?: NO

## 2023-10-17 SDOH — ECONOMIC STABILITY: FOOD INSECURITY: WITHIN THE PAST 12 MONTHS, THE FOOD YOU BOUGHT JUST DIDN'T LAST AND YOU DIDN'T HAVE MONEY TO GET MORE.: NEVER TRUE

## 2023-10-17 SDOH — ECONOMIC STABILITY: FOOD INSECURITY: WITHIN THE PAST 12 MONTHS, YOU WORRIED THAT YOUR FOOD WOULD RUN OUT BEFORE YOU GOT MONEY TO BUY MORE.: NEVER TRUE

## 2023-10-17 SDOH — ECONOMIC STABILITY: INCOME INSECURITY: HOW HARD IS IT FOR YOU TO PAY FOR THE VERY BASICS LIKE FOOD, HOUSING, MEDICAL CARE, AND HEATING?: NOT HARD AT ALL

## 2023-10-17 ASSESSMENT — PATIENT HEALTH QUESTIONNAIRE - PHQ9
SUM OF ALL RESPONSES TO PHQ QUESTIONS 1-9: 0
SUM OF ALL RESPONSES TO PHQ QUESTIONS 1-9: 0
SUM OF ALL RESPONSES TO PHQ9 QUESTIONS 1 & 2: 0
SUM OF ALL RESPONSES TO PHQ QUESTIONS 1-9: 0
1. LITTLE INTEREST OR PLEASURE IN DOING THINGS: 0
2. FEELING DOWN, DEPRESSED OR HOPELESS: 0
SUM OF ALL RESPONSES TO PHQ QUESTIONS 1-9: 0

## 2023-10-17 NOTE — PROGRESS NOTES
Activity    Alcohol use: Yes     Alcohol/week: 2.0 standard drinks of alcohol     Types: 2 Glasses of wine per week     Social Determinants of Health     Financial Resource Strain: Low Risk  (10/17/2023)    Overall Financial Resource Strain (CARDIA)     Difficulty of Paying Living Expenses: Not hard at all   Food Insecurity: No Food Insecurity (10/17/2023)    Hunger Vital Sign     Worried About Running Out of Food in the Last Year: Never true     Ran Out of Food in the Last Year: Never true   Transportation Needs: Unknown (10/17/2023)    PRAPARE - Transportation     Lack of Transportation (Non-Medical): No   Housing Stability: Unknown (10/17/2023)    Housing Stability Vital Sign     Unstable Housing in the Last Year: No         Allergies:  Minocycline    Current Medications:    Prior to Admission medications    Medication Sig Start Date End Date Taking? Authorizing Provider   Multiple Vitamins-Minerals (MULTIVITAMIN PO) Take  by mouth. Yes Provider, Historical, MD           Physical Exam:      Constitutional:  Well developed, well nourished, no acute distress, non-toxic appearance   Eyes:  PERRL, conjunctiva normal   HENT:  Atraumatic, external ears normal, nose normal, oropharynx moist, no pharyngeal exudates. Neck- normal range of motion, no tenderness, supple   Respiratory:  No respiratory distress, normal breath sounds, no rales, no wheezing   Cardiovascular:  Normal rate, normal rhythm, no murmurs, no gallops, no rubs   GI:  Soft, nondistended, normal bowel sounds, nontender, no organomegaly, no mass, no rebound, no guarding   :  No costovertebral angle tenderness   Musculoskeletal:  No edema, no tenderness, no deformities.  Back- no tenderness  Integument:  Well hydrated, no rash   Lymphatic:  No lymphadenopathy noted   Neurologic:  Alert & oriented x 3, CN 2-12 normal, normal motor function, normal sensory function, no focal deficits noted   Psychiatric:  Speech and behavior appropriate       Vitals: BP

## 2023-10-30 ENCOUNTER — TELEPHONE (OUTPATIENT)
Dept: INTERNAL MEDICINE CLINIC | Age: 38
End: 2023-10-30

## 2023-10-30 DIAGNOSIS — Z01.00 EYE EXAM, ROUTINE: Primary | ICD-10-CM

## 2023-10-30 NOTE — TELEPHONE ENCOUNTER
Patient needs a referral to:    Dr. Melanie Rosa  Ph. [phone]  Fax [phone]    Please call and advise when referral has been made.

## 2024-03-18 NOTE — TELEPHONE ENCOUNTER
Pt is requesting a cough suppressant.  Cough is dry, croup like but not productive. His kids are sick and it is going around the house.      Please send to:   [facility]  [address] [place]  [address] [phone]     Please advise at:  [phone]

## 2024-03-19 RX ORDER — GUAIFENESIN/DEXTROMETHORPHAN 100-10MG/5
5 SYRUP ORAL 3 TIMES DAILY PRN
Qty: 120 ML | Refills: 0 | Status: SHIPPED | OUTPATIENT
Start: 2024-03-19 | End: 2024-03-29

## 2024-05-29 NOTE — TELEPHONE ENCOUNTER
ENDOMETRIAL BIOPSY    Date:  1984    Name:  Diana Foss     :  1984    Age:  40 y.o.    Reason for procedure:  history of endometrial hyperplasia, menorrhagia with irregular cycles.        Speculum was inserted.  Cervix visualized and cleansed with betadine.  Single-tooth tenaculum was not applied to anterior lip.  Curette was inserted through OS.  Uterus sounded to 9 cm.  Tissue obtained was moderate in amount.  Patient tolerated procedure well.        Cruz Aguilera MD     Patient had a cold approximately 3 weeks ago - stuffy nose, headache, no fever, little congestion, sneezing. He tested negative for Covid. All symptoms are gone. Currently his chest is a little sore when taking a deep breath or running. He is asking if it is okay to get a flu shot at this time? ? Please call and advise, okay to leave a message.

## 2024-07-15 ENCOUNTER — TELEPHONE (OUTPATIENT)
Dept: INTERNAL MEDICINE CLINIC | Age: 39
End: 2024-07-15

## 2024-07-15 DIAGNOSIS — Z30.09 VASECTOMY EVALUATION: Primary | ICD-10-CM

## 2024-07-15 NOTE — TELEPHONE ENCOUNTER
Pt is requesting a referral for a vasectomy. At his last appt Dr Salmeron stated that if he needed a referral to give the office a call.      Please advise pt at:    [phone]    The patient is a 14y Female complaining of migraine.

## 2024-07-22 ENCOUNTER — TELEPHONE (OUTPATIENT)
Dept: INTERNAL MEDICINE CLINIC | Age: 39
End: 2024-07-22

## 2024-07-22 DIAGNOSIS — Z01.00 EYE EXAM, ROUTINE: Primary | ICD-10-CM

## 2024-07-22 NOTE — TELEPHONE ENCOUNTER
Pt was referred to Kevin Encarnacion for his eyes and was told he had to wait a year to see him and is now trying to schedule an appointment and he is needing a new referral for Dr. Encarnacion and faxed over to them if possible.          Please call and advise [phone]

## 2024-07-26 ENCOUNTER — TELEPHONE (OUTPATIENT)
Dept: INTERNAL MEDICINE CLINIC | Age: 39
End: 2024-07-26

## 2024-07-26 DIAGNOSIS — S05.00XA CORNEAL ABRASION, UNSPECIFIED LATERALITY, INITIAL ENCOUNTER: Primary | ICD-10-CM

## 2024-07-26 NOTE — TELEPHONE ENCOUNTER
JOSHI is calling regarding the patient's referral for an eye exam and/or Dr Encarnacion.  Per the office, Dr Encarnacion does not do routine eye exams.  The patient does see Dr Shepard for routine exams.  If Dr Salmeron wants Dr Encarnacion to treat and see the patient for a Corneal Consult a new referral needs to be sent stating Corneal Consult with a Corneal diagnosis.     Please let [facility] or the patient know

## 2024-07-29 ENCOUNTER — TELEPHONE (OUTPATIENT)
Dept: DERMATOLOGY | Age: 39
End: 2024-07-29

## 2024-07-29 NOTE — TELEPHONE ENCOUNTER
Called and left message for patient to call back office.  Dr. Roman okayed patient to get schedule for NP appointment when he has an availability.

## 2024-07-29 NOTE — TELEPHONE ENCOUNTER
Patien't mom works with Dr. Roman at [facility] would like an appt for skin check [phone]

## 2024-07-30 NOTE — TELEPHONE ENCOUNTER
[facility] eye [place] is calling again to follow up on this     Please call and advise with Naima   [phone]

## 2024-08-19 ENCOUNTER — TELEPHONE (OUTPATIENT)
Dept: INTERNAL MEDICINE CLINIC | Age: 39
End: 2024-08-19

## 2024-08-19 NOTE — TELEPHONE ENCOUNTER
----- Message from Chu AMBROCIO sent at 8/19/2024 12:17 PM EDT -----  Regarding: [facility] Appointment Request  [facility] Appointment Request    Patient needs appointment for [facility] Appointment Type: Annual Visit.  awv    Patient Requested Dates(s):first week of November   Patient Requested Time: any time  Provider Name: Fei Salmeron MD        Reason for Appointment Request: Established Patient - Available appointments did not meet patient need  --------------------------------------------------------------------------------------------------------------------------    Relationship to Patient: Self     Call Back Information: OK to leave message on voicemail  Preferred Call Back Number: Phone [phone]

## 2024-11-05 ENCOUNTER — TELEPHONE (OUTPATIENT)
Dept: INTERNAL MEDICINE CLINIC | Age: 39
End: 2024-11-05

## 2024-11-05 DIAGNOSIS — Z00.00 WELL ADULT EXAM: Primary | ICD-10-CM

## 2024-11-05 NOTE — TELEPHONE ENCOUNTER
Pt has physical on 11/11/24 and would like to do is routine blood work before appointment.    [phone]

## 2024-11-07 DIAGNOSIS — Z00.00 WELL ADULT EXAM: ICD-10-CM

## 2024-11-07 LAB
ALBUMIN SERPL-MCNC: 4.8 G/DL (ref 3.4–5)
ALBUMIN/GLOB SERPL: 1.9 {RATIO} (ref 1.1–2.2)
ALP SERPL-CCNC: 95 U/L (ref 40–129)
ALT SERPL-CCNC: 21 U/L (ref 10–40)
ANION GAP SERPL CALCULATED.3IONS-SCNC: 11 MMOL/L (ref 3–16)
AST SERPL-CCNC: 23 U/L (ref 15–37)
BASOPHILS # BLD: 0 K/UL (ref 0–0.2)
BASOPHILS NFR BLD: 0.8 %
BILIRUB SERPL-MCNC: 0.4 MG/DL (ref 0–1)
BILIRUB UR QL STRIP.AUTO: NEGATIVE
BUN SERPL-MCNC: 17 MG/DL (ref 7–20)
CALCIUM SERPL-MCNC: 9.5 MG/DL (ref 8.3–10.6)
CHLORIDE SERPL-SCNC: 103 MMOL/L (ref 99–110)
CHOLEST SERPL-MCNC: 149 MG/DL (ref 0–199)
CLARITY UR: CLEAR
CO2 SERPL-SCNC: 26 MMOL/L (ref 21–32)
COLOR UR: YELLOW
CREAT SERPL-MCNC: 0.9 MG/DL (ref 0.9–1.3)
DEPRECATED RDW RBC AUTO: 12.8 % (ref 12.4–15.4)
EOSINOPHIL # BLD: 0.1 K/UL (ref 0–0.6)
EOSINOPHIL NFR BLD: 1.8 %
GFR SERPLBLD CREATININE-BSD FMLA CKD-EPI: >90 ML/MIN/{1.73_M2}
GLUCOSE SERPL-MCNC: 87 MG/DL (ref 70–99)
GLUCOSE UR STRIP.AUTO-MCNC: NEGATIVE MG/DL
HCT VFR BLD AUTO: 45 % (ref 40.5–52.5)
HDLC SERPL-MCNC: 60 MG/DL (ref 40–60)
HGB BLD-MCNC: 15.6 G/DL (ref 13.5–17.5)
HGB UR QL STRIP.AUTO: NEGATIVE
KETONES UR STRIP.AUTO-MCNC: NEGATIVE MG/DL
LDLC SERPL CALC-MCNC: 71 MG/DL
LEUKOCYTE ESTERASE UR QL STRIP.AUTO: NEGATIVE
LYMPHOCYTES # BLD: 2.6 K/UL (ref 1–5.1)
LYMPHOCYTES NFR BLD: 49.2 %
MCH RBC QN AUTO: 31.4 PG (ref 26–34)
MCHC RBC AUTO-ENTMCNC: 34.7 G/DL (ref 31–36)
MCV RBC AUTO: 90.5 FL (ref 80–100)
MONOCYTES # BLD: 0.4 K/UL (ref 0–1.3)
MONOCYTES NFR BLD: 7.8 %
NEUTROPHILS # BLD: 2.2 K/UL (ref 1.7–7.7)
NEUTROPHILS NFR BLD: 40.4 %
NITRITE UR QL STRIP.AUTO: NEGATIVE
PH UR STRIP.AUTO: 6 [PH] (ref 5–8)
PLATELET # BLD AUTO: 208 K/UL (ref 135–450)
PMV BLD AUTO: 8.6 FL (ref 5–10.5)
POTASSIUM SERPL-SCNC: 4 MMOL/L (ref 3.5–5.1)
PROT SERPL-MCNC: 7.3 G/DL (ref 6.4–8.2)
PROT UR STRIP.AUTO-MCNC: NEGATIVE MG/DL
RBC # BLD AUTO: 4.97 M/UL (ref 4.2–5.9)
SODIUM SERPL-SCNC: 140 MMOL/L (ref 136–145)
SP GR UR STRIP.AUTO: 1.03 (ref 1–1.03)
TRIGL SERPL-MCNC: 91 MG/DL (ref 0–150)
TSH SERPL DL<=0.005 MIU/L-ACNC: 2.69 UIU/ML (ref 0.27–4.2)
UA DIPSTICK W REFLEX MICRO PNL UR: NORMAL
URN SPEC COLLECT METH UR: NORMAL
UROBILINOGEN UR STRIP-ACNC: 0.2 E.U./DL
VLDLC SERPL CALC-MCNC: 18 MG/DL
WBC # BLD AUTO: 5.3 K/UL (ref 4–11)

## 2024-11-11 ENCOUNTER — OFFICE VISIT (OUTPATIENT)
Dept: INTERNAL MEDICINE CLINIC | Age: 39
End: 2024-11-11
Payer: COMMERCIAL

## 2024-11-11 VITALS
HEART RATE: 58 BPM | OXYGEN SATURATION: 100 % | DIASTOLIC BLOOD PRESSURE: 68 MMHG | BODY MASS INDEX: 22.22 KG/M2 | WEIGHT: 150 LBS | HEIGHT: 69 IN | SYSTOLIC BLOOD PRESSURE: 100 MMHG

## 2024-11-11 DIAGNOSIS — Z00.00 PHYSICAL EXAM: Primary | ICD-10-CM

## 2024-11-11 PROCEDURE — 99395 PREV VISIT EST AGE 18-39: CPT | Performed by: INTERNAL MEDICINE

## 2024-11-11 SDOH — ECONOMIC STABILITY: FOOD INSECURITY: WITHIN THE PAST 12 MONTHS, YOU WORRIED THAT YOUR FOOD WOULD RUN OUT BEFORE YOU GOT MONEY TO BUY MORE.: NEVER TRUE

## 2024-11-11 SDOH — ECONOMIC STABILITY: INCOME INSECURITY: HOW HARD IS IT FOR YOU TO PAY FOR THE VERY BASICS LIKE FOOD, HOUSING, MEDICAL CARE, AND HEATING?: NOT HARD AT ALL

## 2024-11-11 SDOH — ECONOMIC STABILITY: FOOD INSECURITY: WITHIN THE PAST 12 MONTHS, THE FOOD YOU BOUGHT JUST DIDN'T LAST AND YOU DIDN'T HAVE MONEY TO GET MORE.: NEVER TRUE

## 2024-11-11 ASSESSMENT — PATIENT HEALTH QUESTIONNAIRE - PHQ9
SUM OF ALL RESPONSES TO PHQ QUESTIONS 1-9: 0
SUM OF ALL RESPONSES TO PHQ9 QUESTIONS 1 & 2: 0
SUM OF ALL RESPONSES TO PHQ QUESTIONS 1-9: 0
2. FEELING DOWN, DEPRESSED OR HOPELESS: NOT AT ALL
1. LITTLE INTEREST OR PLEASURE IN DOING THINGS: NOT AT ALL
SUM OF ALL RESPONSES TO PHQ QUESTIONS 1-9: 0
SUM OF ALL RESPONSES TO PHQ QUESTIONS 1-9: 0

## 2024-11-11 NOTE — PROGRESS NOTES
Annual Wellness Visit     Patient:  Ildefonso Fabian                                               : 1985  Age: 39 y.o.  MRN: [id number]  Date : 2024      CHIEF COMPLAINT: Ildefonso Fabian is a 39 y.o. male who presents for : Physical exam    Generally feels good has been watching his diet and walking but denies any aggressive exercise otherwise denies chest pain shortness of breath or any other problems      Patient Active Problem List    Diagnosis Date Noted    Allergic rhinitis 2013    IBS (irritable bowel syndrome) 2012       Constitutional:  Denies fever or chills   Eyes:  Denies change in visual acuity   HENT:  Denies nasal congestion or sore throat   Respiratory:  Denies cough or shortness of breath   Cardiovascular:  Denies chest pain or edema   GI:  Denies abdominal pain, nausea, vomiting, bloody stools or diarrhea   :  Denies dysuria   Musculoskeletal:  Denies back pain or joint pain   Integument:  Denies rash   Neurologic:  Denies headache, focal weakness or sensory changes   Endocrine:  Denies polyuria or polydipsia   Lymphatic:  Denies swollen glands   Psychiatric:  Denies depression or anxiety     Past Medical History:        Diagnosis Date    Allergic rhinitis 2013    Hepatitis     Hepatitis, unspecified     h/o    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    Family History:  Family History   Problem Relation Age of Onset    Diabetes Brother        Social History:  Social History     Socioeconomic History    Marital status:      Spouse name: None    Number of children: None    Years of education: None    Highest education level: None   Tobacco Use    Smoking status: Never    Smokeless tobacco: Never   Substance and Sexual Activity    Alcohol use: Yes     Alcohol/week: 2.0 standard drinks of alcohol     Types: 2 Glasses of wine per week     Social

## 2024-12-31 ENCOUNTER — TELEPHONE (OUTPATIENT)
Age: 39
End: 2024-12-31

## 2024-12-31 NOTE — TELEPHONE ENCOUNTER
Pt called has a new pt appointment scheduled for February 5th, will be out of town wanted to know if he can reschedule to another time.I told him the nurse would have to call him back to reschedule due to at the present time Dr. Roman is not accepting new patients at this time.              [phone]

## 2025-01-20 ENCOUNTER — APPOINTMENT (OUTPATIENT)
Age: 40
End: 2025-01-20
Payer: COMMERCIAL

## 2025-01-20 ENCOUNTER — HOSPITAL ENCOUNTER (EMERGENCY)
Age: 40
Discharge: HOME | End: 2025-01-21
Attending: EMERGENCY MEDICINE
Payer: COMMERCIAL

## 2025-01-20 DIAGNOSIS — E86.0 DEHYDRATION: ICD-10-CM

## 2025-01-20 DIAGNOSIS — R11.2 NAUSEA AND VOMITING, UNSPECIFIED VOMITING TYPE: Primary | ICD-10-CM

## 2025-01-20 LAB
ALBUMIN SERPL-MCNC: 4.9 G/DL (ref 3.4–5)
ALBUMIN/GLOB SERPL: 1.8 {RATIO}
ALP SERPL-CCNC: 92 U/L (ref 40–129)
ALT SERPL-CCNC: 25 U/L (ref 10–40)
ANION GAP SERPL CALCULATED.3IONS-SCNC: 13 MMOL/L (ref 3–16)
AST SERPL-CCNC: 27 U/L (ref 15–37)
BASOPHILS # BLD: 0.01 K/UL (ref 0–0.2)
BASOPHILS NFR BLD: 0 %
BILIRUB SERPL-MCNC: 0.8 MG/DL (ref 0–1)
BILIRUB UR QL STRIP: NEGATIVE
BUN SERPL-MCNC: 23 MG/DL (ref 7–20)
CALCIUM SERPL-MCNC: 9.3 MG/DL (ref 8.3–10.6)
CHLORIDE SERPL-SCNC: 102 MMOL/L (ref 99–110)
CLARITY UR: CLEAR
CO2 SERPL-SCNC: 25 MMOL/L (ref 21–32)
COLOR UR: YELLOW
COMMENT: ABNORMAL
CREAT SERPL-MCNC: 0.9 MG/DL (ref 0.7–1.8)
D DIMER PPP FEU-MCNC: <0.27 UG/ML FEU (ref 0–0.6)
EOSINOPHIL # BLD: 0.02 K/UL (ref 0–0.6)
EOSINOPHILS RELATIVE PERCENT: 0 %
ERYTHROCYTE [DISTWIDTH] IN BLOOD BY AUTOMATED COUNT: 11.4 % (ref 12.4–15.4)
GFR, ESTIMATED: >90 ML/MIN/1.73M2
GLUCOSE SERPL-MCNC: 121 MG/DL (ref 70–99)
GLUCOSE UR STRIP-MCNC: NEGATIVE MG/DL
HCT VFR BLD AUTO: 47 % (ref 40.5–52.5)
HGB BLD-MCNC: 16.7 G/DL (ref 13.5–17.5)
HGB UR QL STRIP.AUTO: NEGATIVE
IMM GRANULOCYTES # BLD AUTO: 0 K/UL (ref 0–0.5)
IMM GRANULOCYTES NFR BLD: 0 %
KETONES UR STRIP-MCNC: 40 MG/DL
LACTATE BLDV-SCNC: 1.3 MMOL/L (ref 0.4–1.9)
LEUKOCYTE ESTERASE UR QL STRIP: NEGATIVE
LIPASE SERPL-CCNC: 17 U/L (ref 13–60)
LYMPHOCYTES NFR BLD: 0.71 K/UL (ref 1–5.1)
LYMPHOCYTES RELATIVE PERCENT: 8 %
MCH RBC QN AUTO: 30.5 PG (ref 26–34)
MCHC RBC AUTO-ENTMCNC: 35.5 G/DL (ref 31–36)
MCV RBC AUTO: 85.9 FL (ref 80–100)
MONOCYTES NFR BLD: 0.33 K/UL (ref 0–1.3)
MONOCYTES NFR BLD: 4 %
NEUTROPHILS NFR BLD: 89 %
NEUTS SEG NFR BLD: 8.43 K/UL (ref 1.7–7.7)
NITRITE UR QL STRIP: NEGATIVE
PH UR STRIP: 6 [PH] (ref 5–8)
PLATELET # BLD AUTO: 189 K/UL (ref 135–450)
PMV BLD AUTO: 9.7 FL
POTASSIUM SERPL-SCNC: 3.8 MMOL/L (ref 3.5–5.1)
PROT SERPL-MCNC: 7.6 G/DL (ref 6.4–8.2)
PROT UR STRIP-MCNC: NEGATIVE MG/DL
RBC # BLD AUTO: 5.47 M/UL (ref 4.2–5.9)
SODIUM SERPL-SCNC: 140 MMOL/L (ref 136–145)
SP GR UR STRIP: 1.02 (ref 1–1.03)
TROPONIN I SERPL HS-MCNC: 7 NG/L (ref 0–22)
TROPONIN I SERPL HS-MCNC: <6 NG/L (ref 0–22)
UROBILINOGEN UR STRIP-ACNC: 0.2 EU/DL (ref 0–1)
WBC OTHER # BLD: 9.5 K/UL (ref 4–11)

## 2025-01-20 PROCEDURE — 85379 FIBRIN DEGRADATION QUANT: CPT

## 2025-01-20 PROCEDURE — 96361 HYDRATE IV INFUSION ADD-ON: CPT

## 2025-01-20 PROCEDURE — 2580000003 HC RX 258: Performed by: EMERGENCY MEDICINE

## 2025-01-20 PROCEDURE — 83690 ASSAY OF LIPASE: CPT

## 2025-01-20 PROCEDURE — 85025 COMPLETE CBC W/AUTO DIFF WBC: CPT

## 2025-01-20 PROCEDURE — 93005 ELECTROCARDIOGRAM TRACING: CPT | Performed by: EMERGENCY MEDICINE

## 2025-01-20 PROCEDURE — 81003 URINALYSIS AUTO W/O SCOPE: CPT

## 2025-01-20 PROCEDURE — 99285 EMERGENCY DEPT VISIT HI MDM: CPT

## 2025-01-20 PROCEDURE — 84484 ASSAY OF TROPONIN QUANT: CPT

## 2025-01-20 PROCEDURE — 96374 THER/PROPH/DIAG INJ IV PUSH: CPT

## 2025-01-20 PROCEDURE — 71045 X-RAY EXAM CHEST 1 VIEW: CPT

## 2025-01-20 PROCEDURE — 6370000000 HC RX 637 (ALT 250 FOR IP): Performed by: EMERGENCY MEDICINE

## 2025-01-20 PROCEDURE — 83605 ASSAY OF LACTIC ACID: CPT

## 2025-01-20 PROCEDURE — 80053 COMPREHEN METABOLIC PANEL: CPT

## 2025-01-20 PROCEDURE — 6360000002 HC RX W HCPCS: Performed by: EMERGENCY MEDICINE

## 2025-01-20 RX ORDER — 0.9 % SODIUM CHLORIDE 0.9 %
1000 INTRAVENOUS SOLUTION INTRAVENOUS ONCE
Status: COMPLETED | OUTPATIENT
Start: 2025-01-20 | End: 2025-01-21

## 2025-01-20 RX ORDER — ONDANSETRON 2 MG/ML
4 INJECTION INTRAMUSCULAR; INTRAVENOUS ONCE
Status: COMPLETED | OUTPATIENT
Start: 2025-01-20 | End: 2025-01-20

## 2025-01-20 RX ORDER — PROMETHAZINE HYDROCHLORIDE 25 MG/1
25 TABLET ORAL ONCE
Status: COMPLETED | OUTPATIENT
Start: 2025-01-20 | End: 2025-01-20

## 2025-01-20 RX ORDER — 0.9 % SODIUM CHLORIDE 0.9 %
1000 INTRAVENOUS SOLUTION INTRAVENOUS ONCE
Status: COMPLETED | OUTPATIENT
Start: 2025-01-20 | End: 2025-01-20

## 2025-01-20 RX ORDER — PROMETHAZINE HYDROCHLORIDE 25 MG/1
25 SUPPOSITORY RECTAL EVERY 6 HOURS PRN
Qty: 6 SUPPOSITORY | Refills: 0 | Status: SHIPPED | OUTPATIENT
Start: 2025-01-20 | End: 2025-01-21

## 2025-01-20 RX ADMIN — SODIUM CHLORIDE 1000 ML: 9 INJECTION, SOLUTION INTRAVENOUS at 20:59

## 2025-01-20 RX ADMIN — PROMETHAZINE HYDROCHLORIDE 25 MG: 25 TABLET ORAL at 23:28

## 2025-01-20 RX ADMIN — SODIUM CHLORIDE 1000 ML: 9 INJECTION, SOLUTION INTRAVENOUS at 23:29

## 2025-01-20 RX ADMIN — ONDANSETRON 4 MG: 2 INJECTION, SOLUTION INTRAMUSCULAR; INTRAVENOUS at 20:58

## 2025-01-20 ASSESSMENT — PAIN - FUNCTIONAL ASSESSMENT: PAIN_FUNCTIONAL_ASSESSMENT: NONE - DENIES PAIN

## 2025-01-20 ASSESSMENT — LIFESTYLE VARIABLES
HOW OFTEN DO YOU HAVE A DRINK CONTAINING ALCOHOL: NEVER
HOW MANY STANDARD DRINKS CONTAINING ALCOHOL DO YOU HAVE ON A TYPICAL DAY: PATIENT DOES NOT DRINK

## 2025-01-20 NOTE — TELEPHONE ENCOUNTER
Pt hasn't been able to keep anything done, my has been vomiting and is wondering what he should do or how long to wait before trying to go get fluids.     Please call and advise   [phone]

## 2025-01-20 NOTE — TELEPHONE ENCOUNTER
Heart racing, nausea, vomiting.     Patient notified to take Phenergan suppository and 1 hour later try sips of gatorade.    If unable to urinate or experiencing dizzy go to er.

## 2025-01-20 NOTE — TELEPHONE ENCOUNTER
Dizzy when you stand, or you are not urinating then go to er.    Can give phenergan suppositories and then an hour later try sips of gatorade.

## 2025-01-21 VITALS
TEMPERATURE: 98.4 F | WEIGHT: 150 LBS | BODY MASS INDEX: 22.15 KG/M2 | SYSTOLIC BLOOD PRESSURE: 103 MMHG | OXYGEN SATURATION: 98 % | DIASTOLIC BLOOD PRESSURE: 70 MMHG | HEART RATE: 85 BPM | RESPIRATION RATE: 18 BRPM

## 2025-01-21 LAB
EKG ATRIAL RATE: 86 BPM
EKG DIAGNOSIS: NORMAL
EKG P AXIS: 67 DEGREES
EKG P-R INTERVAL: 124 MS
EKG Q-T INTERVAL: 334 MS
EKG QRS DURATION: 76 MS
EKG QTC CALCULATION (BAZETT): 399 MS
EKG R AXIS: 76 DEGREES
EKG T AXIS: 43 DEGREES
EKG VENTRICULAR RATE: 86 BPM

## 2025-01-21 PROCEDURE — 96361 HYDRATE IV INFUSION ADD-ON: CPT

## 2025-01-21 PROCEDURE — 93010 ELECTROCARDIOGRAM REPORT: CPT | Performed by: INTERNAL MEDICINE

## 2025-01-21 RX ORDER — PROMETHAZINE HYDROCHLORIDE 25 MG/1
25 SUPPOSITORY RECTAL EVERY 6 HOURS PRN
Qty: 6 SUPPOSITORY | Refills: 0 | Status: SHIPPED | OUTPATIENT
Start: 2025-01-21

## 2025-01-21 RX ORDER — PROMETHAZINE HYDROCHLORIDE 25 MG/1
25 TABLET ORAL EVERY 6 HOURS PRN
Qty: 6 TABLET | Refills: 0 | Status: SHIPPED | OUTPATIENT
Start: 2025-01-21

## 2025-01-21 RX ORDER — ONDANSETRON 4 MG/1
4 TABLET, FILM COATED ORAL 3 TIMES DAILY PRN
Qty: 10 TABLET | Refills: 0 | Status: SHIPPED | OUTPATIENT
Start: 2025-01-21

## 2025-01-21 NOTE — ED PROVIDER NOTES
[facility] EMERGENCY DEPARTMENT     EMERGENCY DEPARTMENT ENCOUNTER            Pt Name: Ildefonso Fabian   MRN: [id number]   Birthdate 1985   Date of evaluation: 1/20/2025   Provider: Nayeli Santos DO   PCP: Fei Salmeron MD   Note Started: 8:50 PM EST 1/20/25          CHIEF COMPLAINT     Chief Complaint   Patient presents with    Nausea     Pt presents to the ED with c/o nausea and vomiting that started today at approx noon today. Pt states he has also been consitpated and took \"a few stool softeners\"     Vomiting             HISTORY OF PRESENT ILLNESS:   History from : Patient   Limitations to history : None     Ildefonso Fabian is a 39 y.o. male who presents with c/o nausea vomiting onset 1pm pta. Multiple epis vomiting since that time. Last BM Saturday so took stool softener 11am then had large bm and began vomitng shortly thereafter.  Sob prior to admission, better after vomiting. Denies diarrhea.  Patient denies abdominal pain chest pain back pain syncope near syncope.  He complains of generalized weakness.  Denies fever.    Nursing Notes were all reviewed and agreed with, or any disagreements were addressed in the HPI.     REVIEW OF SYSTEMS :    Positives and Pertinent negatives as per HPI.      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    History reviewed. No pertinent surgical history.   CURRENTMEDICATIONS       Previous Medications    MULTIPLE VITAMINS-MINERALS (MULTIVITAMIN PO)    Take  by mouth.    PROMETHAZINE (PROMETHEGAN) 25 MG SUPPOSITORY    Place 1 suppository rectally every 6 hours as needed for Nausea      SCREENINGS          Jada Coma Scale  Eye Opening: Spontaneous  Best Verbal Response: Oriented  Best Motor Response: Obeys commands  [place] Coma Scale Score: 15                CIWA Assessment  BP: 139/85  Pulse: 93

## 2025-01-21 NOTE — DISCHARGE INSTRUCTIONS
Clear liquids and advance to bland diet.    Return to emergency department if persistent vomiting diarrhea fever abdominal pain worse in any way or any problems.

## 2025-01-21 NOTE — ED NOTES
D/C: Order noted for d/c. Pt confirmed d/c paperwork has correct name. Discharge and education instructions reviewed with patient. Teach-back successful.  Pt verbalized understanding and denied questions at this time. No acute distress noted. Patient instructed to follow-up as noted - return to emergency department if symptoms worsen. Patient verbalized understanding. Discharged per EDMD with discharge instructions. Pt discharged to private vehicle. Patient stable upon departure. Thanked patient for choosing [facility] for care.

## 2025-02-28 ENCOUNTER — TELEPHONE (OUTPATIENT)
Dept: INTERNAL MEDICINE CLINIC | Age: 40
End: 2025-02-28

## 2025-02-28 NOTE — TELEPHONE ENCOUNTER
Pt is unsure if he was vaccinated for measles. He is wanting to know if he should retake the vaccine or have a titer done.

## 2025-08-04 RX ORDER — AMOXICILLIN 500 MG/1
500 CAPSULE ORAL 3 TIMES DAILY
Qty: 21 CAPSULE | Refills: 0 | Status: SHIPPED | OUTPATIENT
Start: 2025-08-04 | End: 2025-08-05

## 2025-08-05 ENCOUNTER — OFFICE VISIT (OUTPATIENT)
Age: 40
End: 2025-08-05
Payer: COMMERCIAL

## 2025-08-05 DIAGNOSIS — B07.9 VERRUCA VULGARIS: ICD-10-CM

## 2025-08-05 DIAGNOSIS — D22.9 MULTIPLE NEVI: Primary | ICD-10-CM

## 2025-08-05 DIAGNOSIS — L24.9 IRRITANT HAND DERMATITIS: ICD-10-CM

## 2025-08-05 DIAGNOSIS — B35.3 TINEA PEDIS OF BOTH FEET: ICD-10-CM

## 2025-08-05 PROCEDURE — 99203 OFFICE O/P NEW LOW 30 MIN: CPT | Performed by: DERMATOLOGY

## 2025-08-05 RX ORDER — CICLOPIROX OLAMINE 7.7 MG/G
CREAM TOPICAL
Qty: 30 G | Refills: 2 | Status: SHIPPED | OUTPATIENT
Start: 2025-08-05

## 2025-08-05 RX ORDER — TRIAMCINOLONE ACETONIDE 1 MG/G
OINTMENT TOPICAL
Qty: 30 G | Refills: 2 | Status: SHIPPED | OUTPATIENT
Start: 2025-08-05